# Patient Record
(demographics unavailable — no encounter records)

---

## 2025-07-01 NOTE — PHYSICAL EXAM
[No Acute Distress] : no acute distress [Well Nourished] : well nourished [Well Developed] : well developed [Well-Appearing] : well-appearing [Normal Voice/Communication] : normal voice/communication [Normal Sclera/Conjunctiva] : normal sclera/conjunctiva [PERRL] : pupils equal round and reactive to light [EOMI] : extraocular movements intact [Normal Outer Ear/Nose] : the outer ears and nose were normal in appearance [Normal Oropharynx] : the oropharynx was normal [No JVD] : no jugular venous distention [No Lymphadenopathy] : no lymphadenopathy [Supple] : supple [No Respiratory Distress] : no respiratory distress  [No Accessory Muscle Use] : no accessory muscle use [Clear to Auscultation] : lungs were clear to auscultation bilaterally [Normal Rate] : normal rate  [Regular Rhythm] : with a regular rhythm [Normal S1, S2] : normal S1 and S2 [No Edema] : there was no peripheral edema [No Extremity Clubbing/Cyanosis] : no extremity clubbing/cyanosis [Normal Supraclavicular Nodes] : no supraclavicular lymphadenopathy [Normal Posterior Cervical Nodes] : no posterior cervical lymphadenopathy [Normal Anterior Cervical Nodes] : no anterior cervical lymphadenopathy [No Joint Swelling] : no joint swelling [Grossly Normal Strength/Tone] : grossly normal strength/tone [Coordination Grossly Intact] : coordination grossly intact [No Focal Deficits] : no focal deficits [Normal Gait] : normal gait [Deep Tendon Reflexes (DTR)] : deep tendon reflexes were 2+ and symmetric [Normal Affect] : the affect was normal [Normal Insight/Judgement] : insight and judgment were intact [de-identified] : No oral angioedema [de-identified] : Linear erythematous and edematous plaques with a few linear arrays of edematous papules that evolve quickly to include vesicles and bullae to bilateral arms, neck and small portion of the facial check.

## 2025-07-01 NOTE — PHYSICAL EXAM
[No Acute Distress] : no acute distress [Well Nourished] : well nourished [Well Developed] : well developed [Well-Appearing] : well-appearing [Normal Voice/Communication] : normal voice/communication [Normal Sclera/Conjunctiva] : normal sclera/conjunctiva [PERRL] : pupils equal round and reactive to light [EOMI] : extraocular movements intact [Normal Outer Ear/Nose] : the outer ears and nose were normal in appearance [Normal Oropharynx] : the oropharynx was normal [No JVD] : no jugular venous distention [No Lymphadenopathy] : no lymphadenopathy [Supple] : supple [No Respiratory Distress] : no respiratory distress  [No Accessory Muscle Use] : no accessory muscle use [Clear to Auscultation] : lungs were clear to auscultation bilaterally [Normal Rate] : normal rate  [Regular Rhythm] : with a regular rhythm [Normal S1, S2] : normal S1 and S2 [No Edema] : there was no peripheral edema [No Extremity Clubbing/Cyanosis] : no extremity clubbing/cyanosis [Normal Supraclavicular Nodes] : no supraclavicular lymphadenopathy [Normal Posterior Cervical Nodes] : no posterior cervical lymphadenopathy [Normal Anterior Cervical Nodes] : no anterior cervical lymphadenopathy [No Joint Swelling] : no joint swelling [Grossly Normal Strength/Tone] : grossly normal strength/tone [Coordination Grossly Intact] : coordination grossly intact [No Focal Deficits] : no focal deficits [Normal Gait] : normal gait [Deep Tendon Reflexes (DTR)] : deep tendon reflexes were 2+ and symmetric [Normal Affect] : the affect was normal [Normal Insight/Judgement] : insight and judgment were intact [de-identified] : No oral angioedema [de-identified] : Linear erythematous and edematous plaques with a few linear arrays of edematous papules that evolve quickly to include vesicles and bullae to bilateral arms, neck and small portion of the facial check.

## 2025-07-01 NOTE — PLAN
[FreeTextEntry1] : Medical Decision Making Yonatan Groves presents with an allergic reaction affecting his face, neck, and other areas after working in the yard. The patient has a history of similar reactions annually, typically treated with steroid injections and oral prednisone. This year's presentation is concerning due to facial involvement, which is atypical for the patient. The differential diagnosis includes contact dermatitis, possibly from a plant other than poison ivy, given the distribution of spots rather than linear clusters. An intramuscular steroid injection (dexamethasone 10mg) was administered in the right gluteal region for immediate relief, with oral prednisone prescribed for continued treatment. The decision to use a 12-day course of prednisone with a tapering schedule was made to prevent rebound effects, balancing the need for adequate suppression of symptoms with minimizing steroid exposure. Consideration was given to potential complications, such as spread to the eye area, which influenced the urgency of treatment. The clinician also recommended future allergist consultation to identify potential new allergens, given the change in presentation pattern.  Contact dermatitis Assessment: Patient presents with an allergic reaction after working in the yard, manifesting as a rash on the face, including near the eyes, and neck. This is a recurring annual issue, but the facial involvement is new this year. The rash appears as spots rather than linear clusters typical of poison ivy. Given the patient's history and presentation, this is likely contact dermatitis from an unidentified allergen in the yard. Plan: - Administered dexamethasone 10 mg IM injection in right gluteus muscle   - Lot number: [id number]   - Expiration date: January 2027 - Patient stayed within the clinic area for 15 minutes after injection to ensure no adverse reactions or complications occurred.  Tolerated injection well. - Prescribed prednisone 20 mg tablets for 10 days with tapering schedule:   - 2 tablets daily for 4 days   - 1 tablets daily for 4 days   - 1/2 tablet daily for 4 days - Advised to take medication in the morning once daily - Instructed patient to complete full course of medication to prevent rebound effect - Recommended taking pictures of the rash for future reference - Suggested consultation with an allergist for further evaluation Discussed and reviewed current medications Patient to continue with present medications - all medications reconciled/reviewed during this visit and listed above.   Increase fluid intake. RTO in 7-10 days for re-evaluation if s/s are not improving.  follow up sooner if worsening.  Go to the ER if trouble swallowing or breathing occurs.  At least 30 minutes was spent with patient face to face examining and reviewing findings/results during this visit. Ample time was provided to answer any questions or address concerns to the patients satisfaction.

## 2025-07-01 NOTE — PLAN
[FreeTextEntry1] : Medical Decision Making Yonatan Groves presents with an allergic reaction affecting his face, neck, and other areas after working in the yard. The patient has a history of similar reactions annually, typically treated with steroid injections and oral prednisone. This year's presentation is concerning due to facial involvement, which is atypical for the patient. The differential diagnosis includes contact dermatitis, possibly from a plant other than poison ivy, given the distribution of spots rather than linear clusters. An intramuscular steroid injection (dexamethasone 10mg) was administered in the right gluteal region for immediate relief, with oral prednisone prescribed for continued treatment. The decision to use a 12-day course of prednisone with a tapering schedule was made to prevent rebound effects, balancing the need for adequate suppression of symptoms with minimizing steroid exposure. Consideration was given to potential complications, such as spread to the eye area, which influenced the urgency of treatment. The clinician also recommended future allergist consultation to identify potential new allergens, given the change in presentation pattern.  Contact dermatitis Assessment: Patient presents with an allergic reaction after working in the yard, manifesting as a rash on the face, including near the eyes, and neck. This is a recurring annual issue, but the facial involvement is new this year. The rash appears as spots rather than linear clusters typical of poison ivy. Given the patient's history and presentation, this is likely contact dermatitis from an unidentified allergen in the yard. Plan: - Administered dexamethasone 10 mg IM injection in right gluteus muscle   - Lot number: [id number]   - Expiration date: January 2027 - Patient stayed within the clinic area for 15 minutes after injection to ensure no adverse reactions or complications occurred.  Tolerated injection well. - Prescribed prednisone 20 mg tablets for 10 days with tapering schedule:   - 2 tablets daily for 4 days   - 1 tablets daily for 4 days   - 1/2 tablet daily for 4 days - Advised to take medication in the morning once daily - Instructed patient to complete full course of medication to prevent rebound effect - Recommended taking pictures of the rash for future reference - Suggested consultation with an allergist for further evaluation Discussed and reviewed current medications Patient to continue with present medications - all medications reconciled/reviewed during this visit and listed above.   Increase fluid intake. RTO in 7-10 days for re-evaluation if s/s are not improving.  follow up sooner if worsening.  Go to the ER if trouble swallowing or breathing occurs.  At least 30 minutes was spent with patient face to face examining and reviewing findings/results during this visit. Ample time was provided to answer any questions or address concerns to the patients satisfaction.

## 2025-07-01 NOTE — HISTORY OF PRESENT ILLNESS
[FreeTextEntry8] : Chief Complaint Allergic reaction from working in the yard, with rash on face and neck  History of Present Illness Yonatan Groves presents with an allergic dermatitis reaction affecting his skin, particularly on his involving arms , neck and face. The patient reports a history of similar reactions occurring annually, typically treated with steroid injections and oral prednisone.  Mr. Groves states that the current reaction developed after working in his yard. He notes that this year's reaction is more concerning due to its facial involvement, which is atypical for him. The patient describes the rash as "spots all over" rather than the linear clusters he associates with poison ivy. He specifically mentions having "a ridge here, a ridge on my neck, and a little by the nose." The patient expresses worry about the rash spreading, particularly near his eyes.  The patient reports familiarity with poison ivy and states that he avoids it, suggesting this reaction may be due to a different allergen. He mentions having a pool and inquires about whether swimming could spread the rash. Mr. Groves indicates that he takes medication for high blood pressure.  Medical History - Hypertension, controlled with medication - Recurrent seasonal allergic reaction, typically treated with steroid injections and oral prednisone  Medications and Supplements - Prednisone   - Previously prescribed for allergic reaction   - Taken with tapering schedule: 5, 4, 3, 2, 1 - High blood pressure medicine  Review of Systems Skin: Positive for allergic reaction with spots on face, neck, and other areas. Negative for linear rash typical of poison ivy. HEENT: Positive for rash near eyes and nose.